# Patient Record
Sex: FEMALE | Race: WHITE | NOT HISPANIC OR LATINO | Employment: OTHER | URBAN - METROPOLITAN AREA
[De-identification: names, ages, dates, MRNs, and addresses within clinical notes are randomized per-mention and may not be internally consistent; named-entity substitution may affect disease eponyms.]

---

## 2024-06-03 ENCOUNTER — OFFICE VISIT (OUTPATIENT)
Dept: OBSTETRICS AND GYNECOLOGY | Facility: CLINIC | Age: 74
End: 2024-06-03
Payer: MEDICARE

## 2024-06-03 VITALS
BODY MASS INDEX: 26.98 KG/M2 | WEIGHT: 158 LBS | DIASTOLIC BLOOD PRESSURE: 78 MMHG | HEIGHT: 64 IN | SYSTOLIC BLOOD PRESSURE: 140 MMHG

## 2024-06-03 DIAGNOSIS — Z12.4 CERVICAL CANCER SCREENING: ICD-10-CM

## 2024-06-03 DIAGNOSIS — Z12.31 BREAST CANCER SCREENING BY MAMMOGRAM: ICD-10-CM

## 2024-06-03 DIAGNOSIS — Z01.419 WELL WOMAN EXAM: Primary | ICD-10-CM

## 2024-06-03 PROCEDURE — 88175 CYTOPATH C/V AUTO FLUID REDO: CPT

## 2024-06-03 PROCEDURE — 1126F AMNT PAIN NOTED NONE PRSNT: CPT | Performed by: OBSTETRICS & GYNECOLOGY

## 2024-06-03 PROCEDURE — 1159F MED LIST DOCD IN RCRD: CPT | Performed by: OBSTETRICS & GYNECOLOGY

## 2024-06-03 PROCEDURE — 87624 HPV HI-RISK TYP POOLED RSLT: CPT

## 2024-06-03 PROCEDURE — 1036F TOBACCO NON-USER: CPT | Performed by: OBSTETRICS & GYNECOLOGY

## 2024-06-03 PROCEDURE — 1160F RVW MEDS BY RX/DR IN RCRD: CPT | Performed by: OBSTETRICS & GYNECOLOGY

## 2024-06-03 PROCEDURE — G0101 CA SCREEN;PELVIC/BREAST EXAM: HCPCS | Performed by: OBSTETRICS & GYNECOLOGY

## 2024-06-03 RX ORDER — HYDROCHLOROTHIAZIDE 12.5 MG/1
12.5 CAPSULE ORAL DAILY
COMMUNITY

## 2024-06-03 RX ORDER — OMEPRAZOLE 20 MG/1
CAPSULE, DELAYED RELEASE ORAL
COMMUNITY

## 2024-06-03 RX ORDER — LORATADINE 10 MG/1
CAPSULE, LIQUID FILLED ORAL
COMMUNITY

## 2024-06-03 RX ORDER — LEVOTHYROXINE SODIUM 112 UG/1
56 TABLET ORAL DAILY
COMMUNITY
Start: 2024-03-19

## 2024-06-03 RX ORDER — ESCITALOPRAM OXALATE 20 MG/1
10 TABLET ORAL DAILY
COMMUNITY

## 2024-06-03 ASSESSMENT — ENCOUNTER SYMPTOMS
FEVER: 0
FLANK PAIN: 0
ABDOMINAL DISTENTION: 0
VOMITING: 0
APPETITE CHANGE: 0
COLOR CHANGE: 0
FREQUENCY: 0
UNEXPECTED WEIGHT CHANGE: 0
CHILLS: 0
SLEEP DISTURBANCE: 0
OCCASIONAL FEELINGS OF UNSTEADINESS: 0
SHORTNESS OF BREATH: 0
DYSURIA: 0
NAUSEA: 0
CONSTIPATION: 0
HEMATURIA: 0
BLOOD IN STOOL: 0
DEPRESSION: 0
FATIGUE: 0
LOSS OF SENSATION IN FEET: 0
DIARRHEA: 0
BACK PAIN: 0
ABDOMINAL PAIN: 0

## 2024-06-03 ASSESSMENT — PAIN SCALES - GENERAL: PAINLEVEL: 0-NO PAIN

## 2024-06-03 NOTE — PROGRESS NOTES
"History Of Present Illness  Routine Gyn Exam  Giulia BEGUM Sanchez here for routine WWE.  Pt is postmenopausal.  Denies spotting or bleeding.     Concerns: none.     New health issues or surgeries since last visit: denies.  Exercise: irregular ; walking, yoga .     Gynecologic History  Postmenopausal.  Sexually active: yes.  Last Pap: .   Last mammogram: .   Last Colonoscopy:  up to date per patient .       Obstetric History  OB History    Para Term  AB Living   2         2   SAB IAB Ectopic Multiple Live Births                  # Outcome Date GA Lbr Joni/2nd Weight Sex Delivery Anes PTL Lv   2             1                  Review of Systems   Constitutional:  Negative for appetite change, chills, fatigue, fever and unexpected weight change.   Respiratory:  Negative for shortness of breath.    Cardiovascular:  Negative for chest pain.   Gastrointestinal:  Negative for abdominal distention, abdominal pain, blood in stool, constipation, diarrhea, nausea and vomiting.   Endocrine: Negative for cold intolerance and heat intolerance.   Genitourinary:  Negative for dyspareunia, dysuria, flank pain, frequency, genital sores, hematuria, menstrual problem, pelvic pain, urgency, vaginal bleeding, vaginal discharge and vaginal pain.   Musculoskeletal:  Negative for back pain.   Skin:  Negative for color change.   Psychiatric/Behavioral:  Negative for sleep disturbance.        /78 (BP Location: Right arm, Patient Position: Sitting)   Ht 1.626 m (5' 4\")   Wt 71.7 kg (158 lb)   BMI 27.12 kg/m²      Physical Exam  Constitutional:       Appearance: Normal appearance.   HENT:      Head: Normocephalic and atraumatic.   Chest:   Breasts:     Right: Normal.      Left: Normal.   Abdominal:      General: Abdomen is flat.      Palpations: Abdomen is soft.      Tenderness: There is no abdominal tenderness.   Genitourinary:     General: Normal vulva.      Vagina: Normal.      Cervix: Normal.      " Uterus: Normal.       Adnexa: Right adnexa normal and left adnexa normal.      Comments: Pap collected today    Skin:     General: Skin is warm and dry.   Neurological:      Mental Status: She is alert and oriented to person, place, and time.   Psychiatric:         Mood and Affect: Mood normal.              Assessment/Plan         Routine Well Woman Exam Today  Discussed diet and exercise.   Reviewed routine health screenings.   Pap: pap done today   Recommend annual mammograms. Order placed, pt will schedule. Last mammogram 2023.   Currently up to date on colon cancer screening.                  Nancy Ortiz MD

## 2024-06-11 ENCOUNTER — HOSPITAL ENCOUNTER (OUTPATIENT)
Dept: RADIOLOGY | Facility: HOSPITAL | Age: 74
Discharge: HOME | End: 2024-06-11
Payer: MEDICARE

## 2024-06-11 VITALS — HEIGHT: 64 IN | BODY MASS INDEX: 26.29 KG/M2 | WEIGHT: 154 LBS

## 2024-06-11 DIAGNOSIS — Z12.31 BREAST CANCER SCREENING BY MAMMOGRAM: ICD-10-CM

## 2024-06-11 PROCEDURE — 77067 SCR MAMMO BI INCL CAD: CPT | Performed by: RADIOLOGY

## 2024-06-11 PROCEDURE — 77063 BREAST TOMOSYNTHESIS BI: CPT | Performed by: RADIOLOGY

## 2024-06-11 PROCEDURE — 77063 BREAST TOMOSYNTHESIS BI: CPT

## 2024-06-14 LAB
CYTOLOGY CMNT CVX/VAG CYTO-IMP: NORMAL
HPV HR 12 DNA GENITAL QL NAA+PROBE: NEGATIVE
HPV HR GENOTYPES PNL CVX NAA+PROBE: NEGATIVE
HPV16 DNA SPEC QL NAA+PROBE: NEGATIVE
HPV18 DNA SPEC QL NAA+PROBE: NEGATIVE
LAB AP HPV GENOTYPE QUESTION: YES
LAB AP HPV HR: NORMAL
LABORATORY COMMENT REPORT: NORMAL
PATH REPORT.TOTAL CANCER: NORMAL

## 2025-01-30 DIAGNOSIS — Z12.31 BREAST CANCER SCREENING BY MAMMOGRAM: Primary | ICD-10-CM

## 2025-06-04 ENCOUNTER — APPOINTMENT (OUTPATIENT)
Dept: OBSTETRICS AND GYNECOLOGY | Facility: CLINIC | Age: 75
End: 2025-06-04
Payer: MEDICARE

## 2025-06-04 VITALS
HEIGHT: 64 IN | BODY MASS INDEX: 26.8 KG/M2 | SYSTOLIC BLOOD PRESSURE: 143 MMHG | DIASTOLIC BLOOD PRESSURE: 78 MMHG | WEIGHT: 157 LBS

## 2025-06-04 DIAGNOSIS — Z01.419 ENCOUNTER FOR ANNUAL ROUTINE GYNECOLOGICAL EXAMINATION: Primary | ICD-10-CM

## 2025-06-04 PROCEDURE — 1160F RVW MEDS BY RX/DR IN RCRD: CPT | Performed by: OBSTETRICS & GYNECOLOGY

## 2025-06-04 PROCEDURE — 1036F TOBACCO NON-USER: CPT | Performed by: OBSTETRICS & GYNECOLOGY

## 2025-06-04 PROCEDURE — G0101 CA SCREEN;PELVIC/BREAST EXAM: HCPCS | Performed by: OBSTETRICS & GYNECOLOGY

## 2025-06-04 PROCEDURE — 1126F AMNT PAIN NOTED NONE PRSNT: CPT | Performed by: OBSTETRICS & GYNECOLOGY

## 2025-06-04 PROCEDURE — 1159F MED LIST DOCD IN RCRD: CPT | Performed by: OBSTETRICS & GYNECOLOGY

## 2025-06-04 ASSESSMENT — ENCOUNTER SYMPTOMS
ABDOMINAL DISTENTION: 0
ABDOMINAL PAIN: 0
APPETITE CHANGE: 0
SHORTNESS OF BREATH: 0
UNEXPECTED WEIGHT CHANGE: 0
SLEEP DISTURBANCE: 0
CONSTIPATION: 0
BACK PAIN: 0
DIARRHEA: 0
FLANK PAIN: 0
VOMITING: 0
CHILLS: 0
COLOR CHANGE: 0
FEVER: 0
FATIGUE: 0
NAUSEA: 0
BLOOD IN STOOL: 0
HEMATURIA: 0
DYSURIA: 0
FREQUENCY: 0

## 2025-06-04 ASSESSMENT — PAIN SCALES - GENERAL: PAINLEVEL_OUTOF10: 0-NO PAIN

## 2025-06-04 ASSESSMENT — PATIENT HEALTH QUESTIONNAIRE - PHQ9
2. FEELING DOWN, DEPRESSED OR HOPELESS: NOT AT ALL
SUM OF ALL RESPONSES TO PHQ9 QUESTIONS 1 AND 2: 0
1. LITTLE INTEREST OR PLEASURE IN DOING THINGS: NOT AT ALL

## 2025-06-04 NOTE — PROGRESS NOTES
"History Of Present Illness  Routine Gyn Exam  Giulia Sanchez here for routine WWE.  Pt is postmenopausal.  Denies spotting or bleeding.     Concerns: none.     Medical and surgical histories reviewed with patient.   Exercise: yoga, walking.     Gynecologic History  Postmenopausal.  Sexually active: yes with one male partner .  Last Pap: .   Last mammogram: .   Last Colonoscopy:  cologuard  negative .       Obstetric History  OB History    Para Term  AB Living   2 2 2   2   SAB IAB Ectopic Multiple Live Births             # Outcome Date GA Lbr Joni/2nd Weight Sex Type Anes PTL Lv   2 Term            1 Term                 Review of Systems   Constitutional:  Negative for appetite change, chills, fatigue, fever and unexpected weight change.   Respiratory:  Negative for shortness of breath.    Cardiovascular:  Negative for chest pain.   Gastrointestinal:  Negative for abdominal distention, abdominal pain, blood in stool, constipation, diarrhea, nausea and vomiting.   Endocrine: Negative for cold intolerance and heat intolerance.   Genitourinary:  Negative for dyspareunia, dysuria, flank pain, frequency, genital sores, hematuria, menstrual problem, pelvic pain, urgency, vaginal bleeding, vaginal discharge and vaginal pain.   Musculoskeletal:  Negative for back pain.   Skin:  Negative for color change.   Psychiatric/Behavioral:  Negative for sleep disturbance.        /78 (BP Location: Left arm, Patient Position: Sitting)   Ht 1.626 m (5' 4\")   Wt 71.2 kg (157 lb)   BMI 26.95 kg/m²      Physical Exam  Constitutional:       Appearance: Normal appearance.   HENT:      Head: Normocephalic and atraumatic.   Chest:   Breasts:     Right: Normal.      Left: Normal.   Abdominal:      General: Abdomen is flat.      Palpations: Abdomen is soft.      Tenderness: There is no abdominal tenderness.   Genitourinary:     General: Normal vulva.      Vagina: Normal.      Cervix: Normal.      Uterus: " Normal.       Adnexa: Right adnexa normal and left adnexa normal.   Skin:     General: Skin is warm and dry.   Neurological:      Mental Status: She is alert and oriented to person, place, and time.   Psychiatric:         Mood and Affect: Mood normal.              Assessment/Plan         Routine Well Woman Exam Today  Discussed diet and exercise.   Reviewed routine health screenings.   Pap: 2024 wnl   Recommend annual mammograms. Mammogram 6/2024 wnl. Mammogram order in place.   Colon cancer screening up to date                 Nancy Ortiz MD

## 2025-07-03 ENCOUNTER — HOSPITAL ENCOUNTER (OUTPATIENT)
Dept: RADIOLOGY | Facility: HOSPITAL | Age: 75
Discharge: HOME | End: 2025-07-03
Payer: MEDICARE

## 2025-07-03 VITALS — BODY MASS INDEX: 26.29 KG/M2 | WEIGHT: 154 LBS | HEIGHT: 64 IN

## 2025-07-03 DIAGNOSIS — Z12.31 BREAST CANCER SCREENING BY MAMMOGRAM: ICD-10-CM

## 2025-07-03 PROCEDURE — 77063 BREAST TOMOSYNTHESIS BI: CPT

## 2026-06-08 ENCOUNTER — APPOINTMENT (OUTPATIENT)
Facility: CLINIC | Age: 76
End: 2026-06-08
Payer: MEDICARE